# Patient Record
Sex: MALE | Race: WHITE | URBAN - METROPOLITAN AREA
[De-identification: names, ages, dates, MRNs, and addresses within clinical notes are randomized per-mention and may not be internally consistent; named-entity substitution may affect disease eponyms.]

---

## 2017-07-05 NOTE — PATIENT DISCUSSION
(O69.945) Keratoconjunct sicca, not specified as Sjogren's, bilateral - Assesment : Examination revealed Dry Eye Syndrome OU. Exposure evident OS. - Plan : Recommend Artificial tears PRN OU , especially at night before bed. Monitor for changes. Advised patient to call our office with decreased vision or increased symptoms.

## 2017-07-05 NOTE — PATIENT DISCUSSION
(H25.13) Age-related nuclear cataract, bilateral - Assesment : Examination revealed cataract. Patient currently has mild symptoms but functioning well. - Plan : Monitor for changes. Advised patient to call our office with decreased vision or increased symptoms.  RV 1 year Exam.

## 2017-07-05 NOTE — PATIENT DISCUSSION
(H55.301) Vitreous degeneration, bilateral - Assesment : Examination revealed PVD OU. - Plan : Monitor for changes. Advised patient to call our office with decreased vision or an increase in flashes and/or floaters.

## 2017-08-08 ENCOUNTER — IMPORTED ENCOUNTER (OUTPATIENT)
Dept: URBAN - METROPOLITAN AREA CLINIC 50 | Facility: CLINIC | Age: 72
End: 2017-08-08

## 2017-08-21 ENCOUNTER — IMPORTED ENCOUNTER (OUTPATIENT)
Dept: URBAN - METROPOLITAN AREA CLINIC 50 | Facility: CLINIC | Age: 72
End: 2017-08-21

## 2017-09-05 ENCOUNTER — IMPORTED ENCOUNTER (OUTPATIENT)
Dept: URBAN - METROPOLITAN AREA CLINIC 50 | Facility: CLINIC | Age: 72
End: 2017-09-05

## 2017-09-26 ENCOUNTER — IMPORTED ENCOUNTER (OUTPATIENT)
Dept: URBAN - METROPOLITAN AREA CLINIC 50 | Facility: CLINIC | Age: 72
End: 2017-09-26

## 2017-09-26 NOTE — PATIENT DISCUSSION
"""S/P IOL OD: Sensar AAB00 23.0 +TM/Omidria. Continue post operative instructions and drops per schedule.  """

## 2017-10-03 ENCOUNTER — IMPORTED ENCOUNTER (OUTPATIENT)
Dept: URBAN - METROPOLITAN AREA CLINIC 50 | Facility: CLINIC | Age: 72
End: 2017-10-03

## 2017-10-12 ENCOUNTER — IMPORTED ENCOUNTER (OUTPATIENT)
Dept: URBAN - METROPOLITAN AREA CLINIC 50 | Facility: CLINIC | Age: 72
End: 2017-10-12

## 2017-10-12 NOTE — PATIENT DISCUSSION
"""S/P IOL OS: Sensar AAB00 22.0 (Target: [place]) +TM/Omidria. Continue post operative instructions and drops per schedule.  """

## 2017-10-17 ENCOUNTER — IMPORTED ENCOUNTER (OUTPATIENT)
Dept: URBAN - METROPOLITAN AREA CLINIC 50 | Facility: CLINIC | Age: 72
End: 2017-10-17

## 2017-11-07 ENCOUNTER — IMPORTED ENCOUNTER (OUTPATIENT)
Dept: URBAN - METROPOLITAN AREA CLINIC 50 | Facility: CLINIC | Age: 72
End: 2017-11-07

## 2018-06-26 NOTE — PATIENT DISCUSSION
(H4.352) Vitreous degeneration, right eye - Assesment : Examination revealed a posterior vitreous detachment OU. - Plan : Handouts given on posterior vitreous detachment and risk factors discussed for retinal detachment development. Advised to call immediately with any changes.

## 2018-06-26 NOTE — PATIENT DISCUSSION
(R01.592) Keratoconjunct sicca, not specified as Sjogren's, bilateral - Assesment : Examination revealed Dry Eye Syndrome OU. - Plan : Continue Artificial tears PRN OU , especially at night before bed. Recommend using Restasis BID OU to try for 2 months. Advised to use ATs in conjunction with Restasis. May try Xiidra if no improvement with Restasis. Monitor for changes. Advised patient to call our office with decreased vision or increased symptoms. Discussed  possible punctal plugs insertion in the future if drops do not help.   RTC 3 mth f/u

## 2018-06-26 NOTE — PATIENT DISCUSSION
(H25.13) Age-related nuclear cataract, bilateral - Assesment : Examination revealed cataract. Patient currently has mild symptoms but functioning well. - Plan : Monitor for changes. Advised patient to call our office with decreased vision or increased symptoms.

## 2018-10-01 NOTE — PATIENT DISCUSSION
(Z94.459) Keratoconjunct sicca, not specified as Sjogren's, bilateral - Assesment : Examination revealed Dry Eye Syndrome OU. - Plan : Continue Artificial tears PRN OU and restasis BID OU. Recommend putting restasis in the fridge to try to reduce burning. Monitor for changes. Advised patient to call our office with decreased vision or increased symptoms. Discussed  possible punctal plugs if no improvement with drops. RV 3 months follow up.

## 2018-10-01 NOTE — PATIENT DISCUSSION
(H10.45) Other chronic allergic conjunctivitis - Assesment : Examination revealed Allergic Conjunctivitis. Follicles OU. - Plan : Recommend Alrex BID OU until sample is finished. Monitor for changes. Advised patient to call our office with decreased vision or an increase in symptoms.

## 2018-12-17 NOTE — PATIENT DISCUSSION
(H10.45) Other chronic allergic conjunctivitis - Assesment : Examination revealed Allergic Conjunctivitis. Follicles 2+ OU. - Plan : See plan #1. Monitor for changes. Advised patient to call our office with decreased vision or an increase in symptoms.

## 2019-07-08 NOTE — PATIENT DISCUSSION
(Y31.796) Keratoconjunct sicca, not specified as Sjogren's, bilateral - Assesment : Examination revealed Dry Eye Syndrome OU. Unchanged. - Plan : ContinueRrestasis BID OU and PF Artificial tears 2-3 times daily OU to help wash out allergens. Monitor for changes. Advised patient to call our office with decreased vision or increased symptoms. Glasses Rx updated and given.    RTC 1 year/EXAM

## 2019-07-08 NOTE — PATIENT DISCUSSION
(X38.784) Vitreous degeneration, bilateral - Assesment : Examination revealed a posterior vitreous detachment. - Plan : Monitor for changes. Advised patient to call our office with decreased vision or an increase in flashes and/or floaters.

## 2021-04-17 ASSESSMENT — TONOMETRY
OS_IOP_MMHG: 18
OS_IOP_MMHG: 15
OD_IOP_MMHG: 18
OD_IOP_MMHG: 13
OD_IOP_MMHG: 17
OS_IOP_MMHG: 15
OD_IOP_MMHG: 19
OS_IOP_MMHG: 19
OD_IOP_MMHG: 16
OS_IOP_MMHG: 14
OS_IOP_MMHG: 17
OD_IOP_MMHG: 24

## 2021-04-17 ASSESSMENT — VISUAL ACUITY
OD_SC: 20/60-1
OD_SC: 20/200
OS_CC: J2@ 18 IN
OS_CC: 20/400
OD_BAT: >20/400
OD_CC: 20/200+1
OS_PH: 20/200
OD_BAT: >20/400
OD_OTHER: >20/400.
OD_SC: 20/60-
OS_SC: 20/200
OD_CC: J2@ 18 IN
OS_CC: 20/400
OS_SC: 20/200
OD_CC: 20/100
OD_BAT: >20/400
OD_OTHER: >20/400.
OS_CC: 20/400
OD_OTHER: >20/400.
OD_SC: 20/70+

## 2021-06-17 NOTE — PATIENT DISCUSSION
Monitor for changes. Advised patient to call our office with decreased vision or increased symptoms. Glasses Rx updated and given.  RTC 1 year/EXAM.

## 2021-06-17 NOTE — PATIENT DISCUSSION
Patient is interested in try a cheaper alternative to Restasis.  Advised patient of Ronn Partida to be sent.